# Patient Record
(demographics unavailable — no encounter records)

---

## 2025-06-04 NOTE — PHYSICAL EXAM
[FreeTextEntry3] :  Gen:                        Well appearing, well nourished, NAD. Skin:                       Eccrine:                 Within normal limits   Scalp:                     Face:                      Neck:                      Chest:                     Abdomen:               Back:                      UE:                         LE:                          Buttocks:            Neuro:                     No focal deficits. Age appropriate. Psych:                     Appropriate affect.

## 2025-06-04 NOTE — HISTORY OF PRESENT ILLNESS
[FreeTextEntry1] : NPA here for FSE [de-identified] : NPA here for FSE - mother and grandmother had BCC - no personal history  lentigines and SK noted on the face with benign dermoscopy prior dermatitis axillae with use of deodorant

## 2025-06-04 NOTE — ASSESSMENT
[FreeTextEntry1] : -- NPA here for FSE - mother and grandmother had BCC - no personal history  lentigines and SK noted on the face with benign dermoscopy prior dermatitis axillae with use of deodorant  #SKs #lentigines -benign dermoscopy Benign nature discussed. Reassurance. No tx required. sun protection  #Irritant dermatitis vs ACD - underarms -not active today. PIPA noted L axilla. pt states resolves when not using d/o -recommend trial of vanicream and avoid other brands at this time  #Skin screening  --mother and grandmother had BCC - no personal history Skin condition screening performed today -Skin exam included the scalp, face, ears, neck, chest, abdomen, back, buttocks and bilateral upper and lower extremities; exam of intergluteal cleft and genitals was deferred. -Dermoscopy as clinically indicated was performed. -Skin exam unremarkable, except as otherwise noted -Counseled re: skin exams and sun protection